# Patient Record
Sex: MALE | Race: OTHER | HISPANIC OR LATINO | URBAN - METROPOLITAN AREA
[De-identification: names, ages, dates, MRNs, and addresses within clinical notes are randomized per-mention and may not be internally consistent; named-entity substitution may affect disease eponyms.]

---

## 2020-08-06 ENCOUNTER — EMERGENCY (EMERGENCY)
Facility: HOSPITAL | Age: 45
LOS: 1 days | Discharge: HOME | End: 2020-08-06
Attending: EMERGENCY MEDICINE
Payer: SELF-PAY

## 2020-08-06 VITALS
RESPIRATION RATE: 16 BRPM | HEART RATE: 77 BPM | OXYGEN SATURATION: 98 % | SYSTOLIC BLOOD PRESSURE: 147 MMHG | WEIGHT: 220.02 LBS | TEMPERATURE: 98 F | DIASTOLIC BLOOD PRESSURE: 90 MMHG

## 2020-08-06 PROCEDURE — 99283 EMERGENCY DEPT VISIT LOW MDM: CPT

## 2020-08-06 PROCEDURE — 99282 EMERGENCY DEPT VISIT SF MDM: CPT

## 2020-08-06 RX ORDER — NEOMYCIN/POLYMYXIN B/HYDROCORT
2 SUSPENSION, DROPS(FINAL DOSAGE FORM)(ML) OTIC (EAR)
Qty: 60 | Refills: 0
Start: 2020-08-06 | End: 2020-08-12

## 2020-08-06 RX ORDER — IBUPROFEN 200 MG
1 TABLET ORAL
Qty: 28 | Refills: 0
Start: 2020-08-06 | End: 2020-08-12

## 2020-08-06 RX ORDER — AMOXICILLIN 250 MG/5ML
1 SUSPENSION, RECONSTITUTED, ORAL (ML) ORAL
Qty: 30 | Refills: 0
Start: 2020-08-06 | End: 2020-08-15

## 2020-08-06 NOTE — ED PROVIDER NOTE - CPE EDP HEME LYMPH NORM
Problem: PHYSICAL THERAPY ADULT  Goal: Performs mobility at highest level of function for planned discharge setting  See evaluation for individualized goals  Treatment/Interventions: Functional transfer training, LE strengthening/ROM, Elevations, Therapeutic exercise, Endurance training, Patient/family training, Bed mobility, Equipment eval/education, Gait training, Spoke to nursing          See flowsheet documentation for full assessment, interventions and recommendations  Prognosis: Good  Problem List: Decreased strength, Decreased endurance, Impaired balance, Decreased mobility  Assessment: pt is a 70y/o m who presents to [facility] c generalized weakness  pending colonoscopy + EGD 6/22/18  PMH significant for chronic back pain, CHF, thrombocytopenia, CAD, + prostate cancer  at baseline, pt (I) c functional mobility s AD  resides alone in bi-level home c 5+6 steps to main level c rail  currently presents c significant deficits in strength, balance, gait quality, + activity tolerance noted in PT exam above  Barthel Index 55/100  ambulated 20'x2 c (S) + seated rest 2* fatigue  declined further ambulation at this time 2* significant fatigue  would benefit from skilled PT to maximize functional mobility + improve quality of life  upon d/c, recommend pt return home c hhpt  PT eval of high complexity 2* unstable med status c pt cont to report decreased appetite + weakness  pt pending colonsocopy + EGD 6/22/18  lives alone in bi-level home c 5+6 steps btwn levels  Barriers to Discharge: Inaccessible home environment, Decreased caregiver support     Recommendation: Home PT     PT - OK to Discharge: No    See flowsheet documentation for full assessment  normal...

## 2020-08-06 NOTE — ED PROVIDER NOTE - PATIENT PORTAL LINK FT
You can access the FollowMyHealth Patient Portal offered by [facility] by registering at the following website: [email or web address]. By joining [facility]’s FollowMyHealth portal, you will also be able to view your health information using other applications (apps) compatible with our system.

## 2023-01-24 NOTE — ED PROVIDER NOTE - CROS ED ALLERGIC IMMUN ALL NEG
Problem: Altered Thought Process (Adult/Pediatric)  Goal: *STG: Participates in treatment plan  Outcome: Progressing Towards Goal  Goal: *STG: Remains safe in hospital  Outcome: Progressing Towards Goal  Goal: *STG: Complies with medication therapy  Outcome: Progressing Towards Goal  Goal: *STG: Attends activities and groups  Outcome: Progressing Towards Goal   Patient is resting quietly in bed with eyes closed. No respiratory distress noted. negative...